# Patient Record
Sex: FEMALE | Race: WHITE
[De-identification: names, ages, dates, MRNs, and addresses within clinical notes are randomized per-mention and may not be internally consistent; named-entity substitution may affect disease eponyms.]

---

## 2017-03-29 ENCOUNTER — HOSPITAL ENCOUNTER (OUTPATIENT)
Dept: HOSPITAL 80 - FIMAGING | Age: 62
End: 2017-03-29
Attending: FAMILY MEDICINE
Payer: COMMERCIAL

## 2017-03-29 DIAGNOSIS — Z79.899: ICD-10-CM

## 2017-03-29 DIAGNOSIS — Z13.820: Primary | ICD-10-CM

## 2017-03-29 DIAGNOSIS — M81.0: ICD-10-CM

## 2017-03-29 DIAGNOSIS — Z78.0: ICD-10-CM

## 2017-08-04 ENCOUNTER — HOSPITAL ENCOUNTER (OUTPATIENT)
Dept: HOSPITAL 80 - FIMAGING | Age: 62
End: 2017-08-04
Attending: FAMILY MEDICINE
Payer: COMMERCIAL

## 2017-08-04 DIAGNOSIS — Z12.39: Primary | ICD-10-CM

## 2017-08-04 PROCEDURE — G0202 SCR MAMMO BI INCL CAD: HCPCS

## 2019-02-26 ENCOUNTER — HOSPITAL ENCOUNTER (OUTPATIENT)
Dept: HOSPITAL 80 - FIMAGING | Age: 64
End: 2019-02-26
Attending: FAMILY MEDICINE
Payer: COMMERCIAL

## 2019-02-26 DIAGNOSIS — N83.9: Primary | ICD-10-CM

## 2019-02-27 ENCOUNTER — HOSPITAL ENCOUNTER (OUTPATIENT)
Dept: HOSPITAL 80 - CIMAGING | Age: 64
End: 2019-02-27
Attending: FAMILY MEDICINE
Payer: COMMERCIAL

## 2019-02-27 DIAGNOSIS — N83.8: Primary | ICD-10-CM

## 2019-02-27 DIAGNOSIS — Z90.89: ICD-10-CM

## 2019-02-27 DIAGNOSIS — K82.9: ICD-10-CM

## 2019-02-27 DIAGNOSIS — N28.9: ICD-10-CM

## 2019-03-24 ENCOUNTER — HOSPITAL ENCOUNTER (EMERGENCY)
Dept: HOSPITAL 80 - FED | Age: 64
Discharge: HOME | End: 2019-03-24
Payer: COMMERCIAL

## 2019-03-24 VITALS — SYSTOLIC BLOOD PRESSURE: 142 MMHG | DIASTOLIC BLOOD PRESSURE: 82 MMHG

## 2019-03-24 DIAGNOSIS — K62.5: Primary | ICD-10-CM

## 2019-03-24 NOTE — EDPHY
H & P


Stated Complaint: Rectal bleeding - small amount of blood noticing when wiping.


Time Seen by Provider: 03/24/19 09:31


HPI/ROS: 





CHIEF COMPLAINT:  Scant hematochezia





HISTORY OF PRESENT ILLNESS:  The patient presents to the ED for evaluation of 1 

day of very mild scant hematochezia.  The patient did noticed a small amount of 

blood while wiping.  The patient denies any abdominal pain.  She denies melena.

  She denies any hematemesis.  The patient is scheduled to see Dr. Sara crain with Gastroenterology for a follow-up appointment the 1st week in April.  

She is not anticoagulated.  The patient does report that she has had some 

constipation this week.  She denies additional acute complaints.





REVIEW OF SYSTEMS:


A comprehensive 10 point review of systems is otherwise negative aside from 

elements mentioned in the history of present illness.


Source: Patient


Exam Limitations: No limitations





- Personal History


Current Tetanus Diphtheria and Acellular Pertussis (TDAP): Unsure





- Medical/Surgical History


Hx Asthma: No


Hx Chronic Respiratory Disease: No


Hx Diabetes: No


Hx Cardiac Disease: No


Hx Renal Disease: No


Hx Cirrhosis: No


Hx Alcoholism: No


Hx HIV/AIDS: No


Hx Splenectomy or Spleen Trauma: No


Other PMH: Appy Jan 2019.





- Social History


Smoking Status: Never smoked





- Physical Exam


Exam: 





General Appearance:  Alert, no distress


Eyes:  Pupils equal and round no pallor or injection


ENT, Mouth:  Mucous membranes moist


Respiratory:  There are no retractions, lungs are clear to auscultation


Cardiovascular:  Regular rate and rhythm


Gastrointestinal:  Abdomen is soft and nontender, no masses, bowel sounds normal


Rectal:  No external hemorrhoids noted, no gross blood noted on digital rectal 

exam





Constitutional: 


 Initial Vital Signs











Temperature (C)  36.8 C   03/24/19 09:30


 


Heart Rate  84   03/24/19 09:30


 


Respiratory Rate  16   03/24/19 09:30


 


Blood Pressure  142/82 H  03/24/19 09:30


 


O2 Sat (%)  96   03/24/19 09:30








 











O2 Delivery Mode               Room Air














Allergies/Adverse Reactions: 


 





No Known Allergies Allergy (Verified 02/12/13 09:57)


 








Home Medications: 














 Medication  Instructions  Recorded


 


Miscellaneous Medical Supply [NO 1 ea MISC AD 02/12/13





HOME MEDS]  














Medical Decision Making


ED Course/Re-evaluation: 





Patient presents the ED with scant rectal hematochezia.  She is hemodynamically 

stable.  She gives no history of significant blood loss.  She has had some 

constipation and may have small anal fissure versus internal hemorrhoid.  The 

patient is scheduled to see Gastroenterology next week.  At this point time I 

feel she takes stool softeners.  She has been advised to return to the ED for 

severe pain, heavy bleeding or other concerns.





Departure





- Departure


Disposition: Home, Routine, Self-Care


Clinical Impression: 


 Hematochezia





Condition: Good


Instructions:  Rectal Bleeding (ED)


Additional Instructions: 


1. Return to the ED for any pain, heavy bleeding or other concerns.


2. I do recommend taking a stool softener like Colace each day.


3. Follow up with Gastroenterology as scheduled in April.


4. I do believe this or severe bleeding is a small internal hemorrhoid verses a 

minor rectal tear from constipation.  


Referrals: 


Maia Fontana MD [Medical Doctor] - As per Instructions

## 2019-04-05 ENCOUNTER — HOSPITAL ENCOUNTER (OUTPATIENT)
Dept: HOSPITAL 80 - FIMAGING | Age: 64
End: 2019-04-05
Attending: INTERNAL MEDICINE
Payer: COMMERCIAL

## 2019-04-05 DIAGNOSIS — K82.4: ICD-10-CM

## 2019-04-05 DIAGNOSIS — D18.03: Primary | ICD-10-CM

## 2019-04-05 DIAGNOSIS — N28.9: ICD-10-CM

## 2019-04-19 ENCOUNTER — HOSPITAL ENCOUNTER (OUTPATIENT)
Dept: HOSPITAL 80 - FIMAGING | Age: 64
End: 2019-04-19
Attending: GENERAL ACUTE CARE HOSPITAL
Payer: COMMERCIAL

## 2019-04-19 DIAGNOSIS — N83.202: Primary | ICD-10-CM

## 2019-04-19 DIAGNOSIS — D25.9: ICD-10-CM
